# Patient Record
Sex: FEMALE | Race: WHITE | NOT HISPANIC OR LATINO | Employment: OTHER | RURAL
[De-identification: names, ages, dates, MRNs, and addresses within clinical notes are randomized per-mention and may not be internally consistent; named-entity substitution may affect disease eponyms.]

---

## 2023-01-24 ENCOUNTER — OFFICE VISIT (OUTPATIENT)
Dept: CARDIOLOGY | Facility: CLINIC | Age: 58
End: 2023-01-24
Payer: MEDICARE

## 2023-01-24 VITALS
HEIGHT: 62 IN | BODY MASS INDEX: 27.27 KG/M2 | WEIGHT: 148.2 LBS | DIASTOLIC BLOOD PRESSURE: 85 MMHG | HEART RATE: 94 BPM | SYSTOLIC BLOOD PRESSURE: 132 MMHG

## 2023-01-24 DIAGNOSIS — E78.2 HYPERLIPEMIA, MIXED: ICD-10-CM

## 2023-01-24 DIAGNOSIS — F17.200 SMOKER: ICD-10-CM

## 2023-01-24 DIAGNOSIS — R00.2 PALPITATIONS: Primary | ICD-10-CM

## 2023-01-24 PROCEDURE — 93000 ELECTROCARDIOGRAM COMPLETE: CPT | Performed by: INTERNAL MEDICINE

## 2023-01-24 PROCEDURE — 99203 OFFICE O/P NEW LOW 30 MIN: CPT | Performed by: INTERNAL MEDICINE

## 2023-01-24 PROCEDURE — 99406 BEHAV CHNG SMOKING 3-10 MIN: CPT | Performed by: INTERNAL MEDICINE

## 2023-01-24 RX ORDER — METOPROLOL SUCCINATE 25 MG/1
25 TABLET, EXTENDED RELEASE ORAL DAILY
Qty: 90 TABLET | Refills: 3 | Status: SHIPPED | OUTPATIENT
Start: 2023-01-24

## 2023-01-24 NOTE — PROGRESS NOTES
Chief Complaint  super ventricular tachycardia    Subjective            Rachelle HERIBERTO Marcial presents to [facility] CARDIOLOGY  History of Present Illness    Ms. Marcial is a 57-year-old white female.  She has no previous significant cardiac problems in the past.  She is treated for palpitations over time and these have been controlled on beta-blocker therapy.  She is treated for mixed hyperlipidemia.  She is a smoker but quit for quite some time before recent stressful events have caused her to resume the smoking.  She is switching cardiology providers.  She has nonobstructive carotid artery disease which is managed medically.  She denies chest pain or excessive shortness of breath.    PMH  History reviewed. No pertinent past medical history.      SURGICALHX  History reviewed. No pertinent surgical history.     SOC  Social History     Socioeconomic History   • Marital status: Single   Tobacco Use   • Smoking status: Every Day     Types: Cigarettes   • Smokeless tobacco: Never   Vaping Use   • Vaping Use: Never used   Substance and Sexual Activity   • Alcohol use: Not Currently   • Drug use: Yes     Types: Marijuana     Comment: not heavily   • Sexual activity: Defer         FAMHX  Family History   Problem Relation Age of Onset   • Stroke Mother    • Heart attack Father    • Parkinsonism Father           ALLERGY  Allergies   Allergen Reactions   • Penicillins Anaphylaxis        MEDSCURRENT    Current Outpatient Medications:   •  Aspirin 81 MG capsule, aspirin  81mg one po daily, Disp: , Rfl:   •  atorvastatin (LIPITOR) 20 MG tablet, , Disp: , Rfl:   •  FOLIC ACID PO, Take 800 mg by mouth., Disp: , Rfl:   •  metoprolol succinate XL (TOPROL-XL) 25 MG 24 hr tablet, Take 1 tablet by mouth Daily., Disp: 90 tablet, Rfl: 3  •  topiramate (TOPAMAX) 50 MG tablet, 2 (Two) Times a Day., Disp: , Rfl:   •  Cholecalciferol 100 MCG (4000 UT) tablet, Take  by mouth., Disp: , Rfl:   •  ciclopirox (PENLAC) 8 %  "solution, ciclopirox 8 % topical solution, Disp: , Rfl:   •  Ciclopirox-Fluconazo-Terbinaf 8-1-1 % solution, Apply  topically., Disp: , Rfl:       Review of Systems   Constitutional: Negative.   HENT: Negative.    Eyes: Negative.    Cardiovascular: Positive for palpitations. Negative for chest pain and dyspnea on exertion.   Respiratory: Negative.    Endocrine: Negative.    Hematologic/Lymphatic: Negative.    Skin: Negative.    Musculoskeletal: Negative.    Gastrointestinal: Negative.    Genitourinary: Negative.    Neurological: Negative.    Psychiatric/Behavioral: Negative.         Objective     /85   Pulse 94   Ht 157.5 cm (62\")   Wt 67.2 kg (148 lb 3.2 oz)   BMI 27.11 kg/m²       General Appearance:   · well developed  · well nourished  HENT:   · oropharynx moist  · lips not cyanotic  Neck:  · thyroid not enlarged  · supple  Respiratory:  · no respiratory distress  · normal breath sounds  · no rales  Cardiovascular:  · no jugular venous distention  · regular rhythm  · apical impulse normal  · S1 normal, S2 normal  · no S3, no S4   · no murmur  · no rub, no thrill  · carotid pulses normal; no bruit  · pedal pulses normal  · lower extremity edema: none    Musculoskeletal:  · no clubbing of fingers.   · normocephalic, head atraumatic  Skin:   · warm, dry  Psychiatric:  · judgement and insight appropriate  · normal mood and affect      Result Review :             Data reviewed: Primary care records reviewed, laboratory studies reviewed, LDL 69, CMP normal       ECG 12 Lead    Date/Time: 1/24/2023 4:22 PM  Performed by: LUCINDA Duarte MD  Authorized by: LUCINDA Duarte MD   Comparison: not compared with previous ECG   Previous ECG: no previous ECG available  Rhythm: sinus rhythm  Conduction: conduction normal  ST Segments: ST segments normal  T Waves: T waves normal  QRS axis: normal  Other: no other findings    Clinical impression: normal ECG              Rachelle Marcial  reports that she has been smoking " cigarettes. She has never used smokeless tobacco.. I have educated her on the risk of diseases from using tobacco products such as cancer, COPD and heart disease.     I advised her to quit and she is willing to quit. We have discussed the following method/s for tobacco cessation:  Counseling.  Together we have set a quit date for When she weans down to 0 cigarettes.  She will follow up with me in several months or sooner to check on her progress.    I spent 3  minutes counseling the patient.                Assessment and Plan        ASSESSMENT:  Encounter Diagnoses   Name Primary?   • Palpitations Yes   • Hyperlipemia, mixed    • Smoker          PLAN:    1.  Palpitations-controlled on beta-blocker therapy, continue.  If the patient has recurrent or worsening palpitations further cardiac rhythm monitoring would be reasonable at that point  2.  Mixed hyperlipidemia, stable, continue statin therapy  3.  Smoking cessation counseling    Annual follow-up will be arranged unless problems arise          Patient was given instructions and counseling regarding her condition or for health maintenance advice. Please see specific information pulled into the AVS if appropriate.             LUCINDA Duarte MD  1/24/2023    16:21 EST

## 2024-01-30 ENCOUNTER — OFFICE VISIT (OUTPATIENT)
Dept: CARDIOLOGY | Facility: CLINIC | Age: 59
End: 2024-01-30
Payer: MEDICARE

## 2024-01-30 VITALS
WEIGHT: 125 LBS | HEART RATE: 71 BPM | SYSTOLIC BLOOD PRESSURE: 120 MMHG | HEIGHT: 62 IN | DIASTOLIC BLOOD PRESSURE: 69 MMHG | BODY MASS INDEX: 23 KG/M2

## 2024-01-30 DIAGNOSIS — R00.2 PALPITATIONS: Primary | ICD-10-CM

## 2024-01-30 DIAGNOSIS — E78.2 HYPERLIPEMIA, MIXED: ICD-10-CM

## 2024-01-30 DIAGNOSIS — F17.200 SMOKER: ICD-10-CM

## 2024-01-30 DIAGNOSIS — I65.23 BILATERAL CAROTID ARTERY STENOSIS: ICD-10-CM

## 2024-01-30 PROCEDURE — 1160F RVW MEDS BY RX/DR IN RCRD: CPT | Performed by: INTERNAL MEDICINE

## 2024-01-30 PROCEDURE — 99214 OFFICE O/P EST MOD 30 MIN: CPT | Performed by: INTERNAL MEDICINE

## 2024-01-30 PROCEDURE — 1159F MED LIST DOCD IN RCRD: CPT | Performed by: INTERNAL MEDICINE

## 2024-01-30 RX ORDER — CHOLECALCIFEROL (VITAMIN D3) 125 MCG
500 CAPSULE ORAL DAILY
COMMUNITY

## 2024-01-30 RX ORDER — METOPROLOL SUCCINATE 25 MG/1
25 TABLET, EXTENDED RELEASE ORAL DAILY
Qty: 90 TABLET | Refills: 3 | Status: SHIPPED | OUTPATIENT
Start: 2024-01-30

## 2024-01-30 NOTE — PROGRESS NOTES
Chief Complaint  Palpitations and Hyperlipidemia    Subjective            Rachelle Marcial presents to [facility]ARDIOLOG[facility]  Palpitations   Pertinent negatives include no chest pain or dizziness.   Hyperlipidemia  Pertinent negatives include no chest pain.       Deon is here for follow-up of palpitations, mixed hyperlipidemia, smoking.  She has asymptomatic carotid artery disease and nonobstructive coronary artery disease.  She denies chest pain, palpitations or excessive shortness of breath.  She is compliant with her medical therapy.    PMH  History reviewed. No pertinent past medical history.      SURGICALHX  History reviewed. No pertinent surgical history.     SOC  Social History     Socioeconomic History    Marital status: Single   Tobacco Use    Smoking status: Every Day     Types: Cigars    Smokeless tobacco: Never   Vaping Use    Vaping Use: Never used   Substance and Sexual Activity    Alcohol use: Not Currently    Drug use: Yes     Types: Marijuana     Comment: not heavily    Sexual activity: Defer         FAMHX  Family History   Problem Relation Age of Onset    Stroke Mother     Heart attack Father     Parkinsonism Father           ALLERGY  Allergies   Allergen Reactions    Penicillins Anaphylaxis        MEDSCURRENT    Current Outpatient Medications:     Aspirin 81 MG capsule, aspirin  81mg one po daily, Disp: , Rfl:     atorvastatin (LIPITOR) 20 MG tablet, , Disp: , Rfl:     Cholecalciferol 100 MCG (4000 UT) tablet, Take  by mouth., Disp: , Rfl:     ciclopirox (PENLAC) 8 % solution, ciclopirox 8 % topical solution, Disp: , Rfl:     Ciclopirox-Fluconazo-Terbinaf 8-1-1 % solution, Apply  topically., Disp: , Rfl:     FOLIC ACID PO, Take 800 mg by mouth., Disp: , Rfl:     metoprolol succinate XL (TOPROL-XL) 25 MG 24 hr tablet, Take 1 tablet by mouth Daily., Disp: 90 tablet, Rfl: 3    topiramate (TOPAMAX) 50 MG tablet, 2 (Two) Times a Day., Disp: , Rfl:     vitamin B-12 (CYANOCOBALAMIN) 500  "MCG tablet, Take 1 tablet by mouth Daily., Disp: , Rfl:       Review of Systems   Cardiovascular:  Positive for palpitations. Negative for chest pain and dyspnea on exertion.   Neurological:  Negative for dizziness, loss of balance and paresthesias.        Objective     /69   Pulse 71   Ht 157.5 cm (62\")   Wt 56.7 kg (125 lb)   BMI 22.86 kg/m²       General Appearance:   well developed  well nourished  HENT:   oropharynx moist  lips not cyanotic  Neck:  thyroid not enlarged  supple  Respiratory:  no respiratory distress  normal breath sounds  no rales  Cardiovascular:  no jugular venous distention  regular rhythm  apical impulse normal  S1 normal, S2 normal  no S3, no S4   no murmur  no rub, no thrill  carotid pulses normal; no bruit  pedal pulses normal  lower extremity edema: none    Musculoskeletal:  no clubbing of fingers.   normocephalic, head atraumatic  Skin:   warm, dry  Psychiatric:  judgement and insight appropriate  normal mood and affect      Result Review :             Data reviewed : Primary care records reviewed, labs reviewed      Procedures      Rachelle LOUIS Aggie  reports that she has been smoking cigars. She has never used smokeless tobacco.. I have educated her on the risk of diseases from using tobacco products such as cancer, COPD, and heart disease.     I advised her to quit and she is not willing to quit.    I spent 3  minutes counseling the patient.                Assessment and Plan        ASSESSMENT:  Encounter Diagnoses   Name Primary?    Palpitations Yes    Hyperlipemia, mixed     Smoker     Bilateral carotid artery stenosis          PLAN:    1.  Palpitations-controlled, continue beta-blocker therapy  2.  Mixed hyperlipidemia-stable, continue statin therapy  3.  Smoker-counseled  4.  Bilateral carotid artery stenosis-she will follow-up with her vascular specialist to consider additional testing.  Most recent ultrasound suggesting greater than 70% stenosis on the right, moderate " stenosis on the left.  Continue aspirin and statin prophylaxis.      Follow-up with me annually otherwise    Patient was given instructions and counseling regarding her condition or for health maintenance advice. Please see specific information pulled into the AVS if appropriate.             LUCINDA Duarte MD  1/30/2024    11:03 EST

## 2025-02-18 ENCOUNTER — OFFICE VISIT (OUTPATIENT)
Dept: CARDIOLOGY | Facility: CLINIC | Age: 60
End: 2025-02-18
Payer: MEDICARE

## 2025-02-18 VITALS
WEIGHT: 145 LBS | HEART RATE: 80 BPM | BODY MASS INDEX: 26.68 KG/M2 | HEIGHT: 62 IN | DIASTOLIC BLOOD PRESSURE: 84 MMHG | SYSTOLIC BLOOD PRESSURE: 145 MMHG

## 2025-02-18 DIAGNOSIS — F17.200 SMOKER: ICD-10-CM

## 2025-02-18 DIAGNOSIS — E78.2 HYPERLIPEMIA, MIXED: ICD-10-CM

## 2025-02-18 DIAGNOSIS — I65.23 BILATERAL CAROTID ARTERY STENOSIS: ICD-10-CM

## 2025-02-18 DIAGNOSIS — R00.2 PALPITATIONS: Primary | ICD-10-CM

## 2025-02-18 PROCEDURE — 99214 OFFICE O/P EST MOD 30 MIN: CPT | Performed by: INTERNAL MEDICINE

## 2025-02-18 PROCEDURE — 1160F RVW MEDS BY RX/DR IN RCRD: CPT | Performed by: INTERNAL MEDICINE

## 2025-02-18 PROCEDURE — 1159F MED LIST DOCD IN RCRD: CPT | Performed by: INTERNAL MEDICINE

## 2025-02-18 RX ORDER — FLUTICASONE PROPIONATE 50 MCG
1 SPRAY, SUSPENSION (ML) NASAL DAILY
COMMUNITY

## 2025-02-18 RX ORDER — CLOPIDOGREL BISULFATE 75 MG/1
75 TABLET ORAL DAILY
COMMUNITY
Start: 2024-12-09 | End: 2025-12-09

## 2025-02-18 NOTE — PROGRESS NOTES
Chief Complaint  Palpitations, Hyperlipidemia, 1 yr follow up , and heart flutter    Subjective            Rachelle Marcial presents to [facility] CARDIOLOGY    Rachelle is here for follow-up of palpitations, mixed hyperlipidemia, smoking.  She has asymptomatic nonobstructive carotid artery disease and nonobstructive coronary artery disease by angiography.  Since last visit she quit smoking.  She is compliant with her medical therapy.  She does report generalized fatigue.  No significant palpitations.  She is compliant with her medical therapy.    PMH  History reviewed. No pertinent past medical history.      SURGICALHX  History reviewed. No pertinent surgical history.     SOC  Social History     Socioeconomic History    Marital status: Single   Tobacco Use    Smoking status: Former     Types: Cigars    Smokeless tobacco: Never   Vaping Use    Vaping status: Never Used   Substance and Sexual Activity    Alcohol use: Not Currently    Drug use: Not Currently     Types: Marijuana     Comment: not heavily    Sexual activity: Defer         FAMHX  Family History   Problem Relation Age of Onset    Stroke Mother     Heart attack Father     Parkinsonism Father           ALLERGY  Allergies   Allergen Reactions    Penicillins Anaphylaxis        MEDSCURRENT    Current Outpatient Medications:     Aspirin 81 MG capsule, aspirin  81mg one po daily, Disp: , Rfl:     atorvastatin (LIPITOR) 20 MG tablet, , Disp: , Rfl:     Cholecalciferol 100 MCG (4000 UT) tablet, Take  by mouth., Disp: , Rfl:     clopidogrel (PLAVIX) 75 MG tablet, Take 1 tablet by mouth Daily., Disp: , Rfl:     fluticasone (FLONASE) 50 MCG/ACT nasal spray, Administer 1 spray into the nostril(s) as directed by provider Daily., Disp: , Rfl:     metoprolol succinate XL (TOPROL-XL) 25 MG 24 hr tablet, Take 1 tablet by mouth Daily., Disp: 90 tablet, Rfl: 3    topiramate (TOPAMAX) 50 MG tablet, 2 (Two) Times a Day., Disp: , Rfl:     ciclopirox (PENLAC) 8 %  "solution, ciclopirox 8 % topical solution (Patient not taking: Reported on 2/18/2025), Disp: , Rfl:     Ciclopirox-Fluconazo-Terbinaf 8-1-1 % solution, Apply  topically. (Patient not taking: Reported on 2/18/2025), Disp: , Rfl:     FOLIC ACID PO, Take 800 mg by mouth. (Patient not taking: Reported on 2/18/2025), Disp: , Rfl:     vitamin B-12 (CYANOCOBALAMIN) 500 MCG tablet, Take 1 tablet by mouth Daily. (Patient not taking: Reported on 2/18/2025), Disp: , Rfl:       Review of Systems   Constitutional: Positive for malaise/fatigue.   Cardiovascular:  Negative for chest pain, dyspnea on exertion and palpitations.   Neurological:  Negative for dizziness, loss of balance and paresthesias.        Objective     /84   Pulse 80   Ht 157.5 cm (62\")   Wt 65.8 kg (145 lb)   BMI 26.52 kg/m²       General Appearance:   well developed  well nourished  HENT:   oropharynx moist  lips not cyanotic  Neck:  thyroid not enlarged  supple  Respiratory:  no respiratory distress  normal breath sounds  no rales  Cardiovascular:  no jugular venous distention  regular rhythm  apical impulse normal  S1 normal, S2 normal  no S3, no S4   no murmur  no rub, no thrill  carotid pulses normal; no bruit  pedal pulses normal  lower extremity edema: none    Musculoskeletal:  no clubbing of fingers.   normocephalic, head atraumatic  Skin:   warm, dry  Psychiatric:  judgement and insight appropriate  normal mood and affect      Result Review :             Data reviewed : Primary care records reviewed, labs reviewed      Procedures           Assessment and Plan        ASSESSMENT:  Encounter Diagnoses   Name Primary?    Palpitations Yes    Hyperlipemia, mixed     Bilateral carotid artery stenosis     Smoker          PLAN:    1.  Palpitations-controlled, continue beta-blocker therapy  2.  Mixed hyperlipidemia-stable on statin therapy.  The patient is concerned that she is developing fatty liver.  While this would be an unusual side effect of " statin therapy she is worried that it was because of the statin.  I told her to follow-up with primary care to see if any additional specific evaluation of her liver function is necessary.  If the overall opinion is that the statin therapy may be affecting her liver function (which is highly unusual), we can try to get her approved for an injectable lipid-lowering therapy such as Repatha or Praluent.  She is going to let us know after following up with PCP.  3.  Smoker-currently she has quit, I have encouraged her to maintain a smoke-free lifestyle  4.  Bilateral carotid artery disease, nonobstructive by angiography.  Continue aspirin and statin therapy.      Annual follow-up will be arranged otherwise    Patient was given instructions and counseling regarding her condition or for health maintenance advice. Please see specific information pulled into the AVS if appropriate.             LUCINDA Duarte MD  2/18/2025    11:57 EST

## 2025-04-18 RX ORDER — METOPROLOL SUCCINATE 25 MG/1
25 TABLET, EXTENDED RELEASE ORAL DAILY
Qty: 90 TABLET | Refills: 3 | Status: SHIPPED | OUTPATIENT
Start: 2025-04-18